# Patient Record
Sex: FEMALE | Race: WHITE | NOT HISPANIC OR LATINO | URBAN - METROPOLITAN AREA
[De-identification: names, ages, dates, MRNs, and addresses within clinical notes are randomized per-mention and may not be internally consistent; named-entity substitution may affect disease eponyms.]

---

## 2017-07-17 ENCOUNTER — APPOINTMENT (OUTPATIENT)
Age: 37
Setting detail: DERMATOLOGY
End: 2017-07-31

## 2017-07-17 PROBLEM — C44.91 BASAL CELL CARCINOMA OF SKIN, UNSPECIFIED: Status: ACTIVE | Noted: 2017-07-17

## 2017-07-17 PROCEDURE — OTHER MOHS SURGERY PHONE CONSULTATION: OTHER

## 2017-07-17 NOTE — PROCEDURE: MOHS SURGERY PHONE CONSULTATION
Date Of Surgical Consultation If Needed: 07/17/2017
Has The Patient Ever Received A Transplant?: No
Detail Level: Detailed
Referring Provider: Tricia Ford PA-C
Has The Pathology Report Been Received?: Yes
Patient Reported Location: R cheek
Office Location Of Mohs Surgery: [facility]
Date Of Mohs Surgery: 07/18/2017

## 2017-07-18 ENCOUNTER — APPOINTMENT (OUTPATIENT)
Age: 37
Setting detail: DERMATOLOGY
End: 2017-07-31

## 2017-07-18 PROBLEM — C44.319 BASAL CELL CARCINOMA OF SKIN OF OTHER PARTS OF FACE: Status: ACTIVE | Noted: 2017-07-18

## 2017-07-18 PROCEDURE — OTHER MIPS QUALITY: OTHER

## 2017-07-18 PROCEDURE — 99201: CPT | Mod: 25

## 2017-07-18 PROCEDURE — OTHER CONSULTATION FOR MOHS SURGERY: OTHER

## 2017-07-18 PROCEDURE — 17311 MOHS 1 STAGE H/N/HF/G: CPT

## 2017-07-18 PROCEDURE — 13131 CMPLX RPR F/C/C/M/N/AX/G/H/F: CPT

## 2017-07-18 PROCEDURE — OTHER MOHS SURGERY: OTHER

## 2017-07-18 NOTE — PROCEDURE: MIPS QUALITY
Quality 431: Preventive Care And Screening: Unhealthy Alcohol Use - Screening: Patient screened for unhealthy alcohol use using a single question and scores less than 2 times per year
Detail Level: Detailed
Quality 130: Documentation Of Current Medications In The Medical Record: Current Medications Documented
Quality 226: Preventive Care And Screening: Tobacco Use: Screening And Cessation Intervention: Patient screened for tobacco and is an ex-smoker
Quality 110: Preventive Care And Screening: Influenza Immunization: Influenza Immunization previously received during influenza season

## 2017-07-18 NOTE — PROCEDURE: MOHS SURGERY
Post-Care Instructions: The patient was provided with detailed verbal and written instructions for daily wound care, including use of H2O2 cleansing, followed by application of plain Vaseline and a bandage.  These instructions including Dr. Hatch's contact information should there be any questions or concerns.  The patient is not to engage in any heavy lifting, exercise, or swimming for the next week.  Should the patient develop any fevers, chills, bleeding, or pain not controlled by OTC analgesics, s/he should contact the office immediately.

## 2017-07-18 NOTE — PROCEDURE: MOHS SURGERY
Body Location Override (Optional - Billing Will Still Be Based On Selected Body Map Location If Applicable): Right Medial Cheek

## 2017-07-18 NOTE — PROCEDURE: CONSULTATION FOR MOHS SURGERY
Name Of The Referring Provider For Procedure: Tricia Ford PA-C
Detail Level: Detailed
Location Indication Override (Is Already Calculated Based On Selected Body Location): Area M
Body Location Override (Optional - Billing Will Still Be Based On Selected Body Map Location If Applicable): Right Medial Cheek
X Size Of Lesion In Cm (Optional): 0.4
Size Of Lesion: 0.5
Incorporate Mauc In Note: Yes

## 2022-07-04 NOTE — PROCEDURE: MOHS SURGERY
259.9 Cheiloplasty (Complex) Text: A decision was made to reconstruct the defect with a  cheiloplasty.  The defect was undermined extensively.  Additional obicularis oris muscle was excised with a 15 blade scalpel.  The defect was converted into a full thickness wedge to facilite a better cosmetic result.  Small vessels were then tied off with 5-0 monocyrl. The obicularis oris, superficial fascia, adipose and dermis were then reapproximated.  After the deeper layers were approximated the epidermis was reapproximated with particular care given to realign the vermillion border.